# Patient Record
Sex: FEMALE | Race: WHITE | NOT HISPANIC OR LATINO
[De-identification: names, ages, dates, MRNs, and addresses within clinical notes are randomized per-mention and may not be internally consistent; named-entity substitution may affect disease eponyms.]

---

## 2022-08-21 ENCOUNTER — NON-APPOINTMENT (OUTPATIENT)
Age: 82
End: 2022-08-21

## 2023-05-10 ENCOUNTER — EMERGENCY (EMERGENCY)
Facility: HOSPITAL | Age: 83
LOS: 1 days | Discharge: HOME | End: 2023-05-10
Attending: EMERGENCY MEDICINE
Payer: MEDICARE

## 2023-05-10 VITALS
HEART RATE: 79 BPM | DIASTOLIC BLOOD PRESSURE: 70 MMHG | SYSTOLIC BLOOD PRESSURE: 193 MMHG | TEMPERATURE: 98 F | WEIGHT: 130.07 LBS | HEIGHT: 63 IN | OXYGEN SATURATION: 96 % | RESPIRATION RATE: 20 BRPM

## 2023-05-10 VITALS
SYSTOLIC BLOOD PRESSURE: 176 MMHG | RESPIRATION RATE: 16 BRPM | HEART RATE: 69 BPM | DIASTOLIC BLOOD PRESSURE: 78 MMHG | OXYGEN SATURATION: 96 %

## 2023-05-10 PROCEDURE — 30901 CONTROL OF NOSEBLEED: CPT | Mod: LT

## 2023-05-10 PROCEDURE — 99283 EMERGENCY DEPT VISIT LOW MDM: CPT

## 2023-05-10 PROCEDURE — 99283 EMERGENCY DEPT VISIT LOW MDM: CPT | Mod: FS,25

## 2023-05-10 RX ORDER — TRANEXAMIC ACID 100 MG/ML
5 INJECTION, SOLUTION INTRAVENOUS ONCE
Refills: 0 | Status: COMPLETED | OUTPATIENT
Start: 2023-05-10 | End: 2023-05-10

## 2023-05-10 RX ADMIN — TRANEXAMIC ACID 5 MILLILITER(S): 100 INJECTION, SOLUTION INTRAVENOUS at 18:41

## 2023-05-10 NOTE — ED PROVIDER NOTE - CLINICAL SUMMARY MEDICAL DECISION MAKING FREE TEXT BOX
left nare 82F presenting to the ED c/o b/l epistaxis x 1 week , 4 episodes in the past week. Left nare with active bleeding. TXA soaked gauzed placed x 15 min. Removed with no reoccurrence of bleeding x 15 min observation. Pt stable for d/c with ENT f/u.

## 2023-05-10 NOTE — ED PROVIDER NOTE - CARE PROVIDER_API CALL
Weston Robertson)  [facility]  [address] [place] [address]  Phone: [phone]  Fax: [phone]  Follow Up Time:

## 2023-05-10 NOTE — ED PROVIDER NOTE - ATTENDING APP SHARED VISIT CONTRIBUTION OF CARE
The patient discussed with PA    Epistaxis    I, Perez Gtz, performed the initial face to face bedside interview with this patient regarding history of present illness, review of symptoms and relevant past medical, social and family history.  I completed an independent physical examination.  I was the initial provider who evaluated this patient. I have signed out the follow up of any pending tests (i.e. labs, radiological studies) to the ACP.  I have communicated the patient’s plan of care and disposition with the ACP.

## 2023-05-10 NOTE — ED PROVIDER NOTE - PATIENT PORTAL LINK FT
You can access the FollowMyHealth Patient Portal offered by [facility] by registering at the following website: [email or web address]. By joining [facility]’s FollowMyHealth portal, you will also be able to view your health information using other applications (apps) compatible with our system.

## 2023-05-10 NOTE — ED PROVIDER NOTE - OBJECTIVE STATEMENT
82F presenting to the ED c/o b/l epistaxis x 1 week , 4 episodes in the past week. Pt otherwise denies dizziness, recent trauma, blood thinner use, fever/chills, c/p, sob, abd pain, n/v/c/d, dysuria, numbness/tingling/weakness and has no other complaints at this time.

## 2023-05-25 PROBLEM — Z00.00 ENCOUNTER FOR PREVENTIVE HEALTH EXAMINATION: Status: ACTIVE | Noted: 2023-05-25

## 2023-05-30 ENCOUNTER — APPOINTMENT (OUTPATIENT)
Dept: OTOLARYNGOLOGY | Facility: CLINIC | Age: 83
End: 2023-05-30
Payer: MEDICARE

## 2023-05-30 VITALS — HEART RATE: 85 BPM | SYSTOLIC BLOOD PRESSURE: 174 MMHG | DIASTOLIC BLOOD PRESSURE: 65 MMHG

## 2023-05-30 VITALS — HEIGHT: 61 IN | WEIGHT: 130 LBS | BODY MASS INDEX: 24.55 KG/M2

## 2023-05-30 DIAGNOSIS — Z80.42 FAMILY HISTORY OF MALIGNANT NEOPLASM OF PROSTATE: ICD-10-CM

## 2023-05-30 DIAGNOSIS — F17.200 NICOTINE DEPENDENCE, UNSPECIFIED, UNCOMPLICATED: ICD-10-CM

## 2023-05-30 DIAGNOSIS — R04.0 EPISTAXIS: ICD-10-CM

## 2023-05-30 DIAGNOSIS — Z86.39 PERSONAL HISTORY OF OTHER ENDOCRINE, NUTRITIONAL AND METABOLIC DISEASE: ICD-10-CM

## 2023-05-30 DIAGNOSIS — Z78.9 OTHER SPECIFIED HEALTH STATUS: ICD-10-CM

## 2023-05-30 PROCEDURE — 99204 OFFICE O/P NEW MOD 45 MIN: CPT | Mod: 25

## 2023-05-30 PROCEDURE — 31231 NASAL ENDOSCOPY DX: CPT

## 2023-05-30 NOTE — PROCEDURE
[FreeTextEntry6] : Procedure: Bilateral nasal endoscopy (CPT 49674) \par \par Indication: Anterior rhinoscopy was inadequate to evaluate pathology.\par \par Left:\par Septum midline\par Moderate inferior turbinate hypertrophy \par Middle meatus clear, without masses, polyps, or pus\par Sphenoethmoidal recess clear, without masses, polyps, or pus\par \par Right: \par Septum deviated right \par Moderate inferior turbinate hypertrophy\par Middle meatus clear, without masses, polyps, or pus \par Sphenoethmoidal recess clear, without masses, polyps, or pus\par \par

## 2023-05-30 NOTE — HISTORY OF PRESENT ILLNESS
[de-identified] : 83 year old female referred by [facility] ER for bilateral epistaxis, R>L. States has had bilateral epistaxis every week for the last month, went to [facility] ER 5/10/23 where transexamic acid was placed on both nostrils, has not had an episode since. States this was the first occurrence of bilateral epistaxis, usually lasted 20 minutes resolved with pressure, but the last epistaxis episode lasted more than an hour. Denies trauma to the nose, cauterization, anticoagulant use, history of bleeding disorders or family history of bleeding disorders. Current smoker, smokes a pack a day and rarely consumes alcohol. \par

## 2023-11-19 ENCOUNTER — NON-APPOINTMENT (OUTPATIENT)
Age: 83
End: 2023-11-19

## 2023-12-12 ENCOUNTER — APPOINTMENT (OUTPATIENT)
Dept: DERMATOLOGY | Facility: CLINIC | Age: 83
End: 2023-12-12
Payer: MEDICARE

## 2023-12-12 PROCEDURE — 99203 OFFICE O/P NEW LOW 30 MIN: CPT | Mod: 25

## 2023-12-12 PROCEDURE — 11102 TANGNTL BX SKIN SINGLE LES: CPT

## 2024-02-23 ENCOUNTER — NON-APPOINTMENT (OUTPATIENT)
Age: 84
End: 2024-02-23

## 2024-02-23 ENCOUNTER — APPOINTMENT (OUTPATIENT)
Dept: DERMATOLOGY | Facility: CLINIC | Age: 84
End: 2024-02-23
Payer: MEDICARE

## 2024-02-23 PROCEDURE — 17313 MOHS 1 STAGE T/A/L: CPT

## 2024-02-23 RX ORDER — CEPHALEXIN 500 MG/1
500 TABLET ORAL 3 TIMES DAILY
Qty: 21 | Refills: 0 | Status: ACTIVE | COMMUNITY
Start: 2024-02-23 | End: 1900-01-01

## 2024-02-24 NOTE — HISTORY OF PRESENT ILLNESS
[FreeTextEntry1] : SCC left lateral distal pretibial shin  [de-identified] : Dermatologic Surgery Consultation   02/23/2024   Referred by: Dr. Stuart   We had the pleasure of seeing your patient in consultation for Dermatologic Surgery.   Ms. LOC VAUGHN is a 83 year old F who presents for SCC on left lateral distal pretibial shin. The area was noticed by her grandson a few months ago who urged her to get evaluated. No other history of NMSC.   Pertinent positives noted below History of HIV or hepatitis: No Blood thinners: None Antibiotic Prophylaxis: None Medical implants: None   Social History: current smoker      The patient's review of systems questionnaire was reviewed. Education needs were identified. There were no barriers to learning.   Dermatologic surgery consultation for SCC left lateral distal pretibial shin    -- I explained the treatment options of topical immunomodulatory or chemotherapy, electrodessication and curettage, radiation therapy, excision and Mohs surgery.  I recommended Mohs surgery due to the tumor type, location, and size of the skin cancer.   Mohs Appropriate Use Criteria (AUC) Score: 8   I explained that following extirpation there will be a full thickness defect of the involved area. The reconstructive options will be based on the defect size and surrounding tissue laxity of the involved area. Primary closure is only possible for smaller defects. For larger defects, local tissue rearrangement or skin grafting may be necessary. Risks following layered primary closure or local tissue rearrangement include wound dehiscence, contour irregularity, bleeding, infection, and paresthesia (nerve damage including sensory deficit or motor weakness). Risks following skin grafting include wound dehiscence, skin graft nonadherence (partial or complete), contour irregularity, bleeding, infection, paresthesia, and donor site complications. I explained that the patient will need to abstain from physical activity for 1-2 weeks following the surgery, that they would heal with a scar, and also discussed the chances of infection, bleeding, potential sensory or motor nerve damage, and recurrence.  The patient indicated that s/he understood the risks and wished to proceed today -- In particular, for reconstruction we discussed secondary intent.    Thank you for this dermatologic surgery referral. We recommended that Ms. LOC VAUGHN follow up with Her referring dermatologist for routine skin exams following surgery.

## 2024-02-24 NOTE — PROCEDURE
[TextEntry] : A surgical time out was taken to confirm the patient's correct identity and the correct site. The operative site was identified by the patient and surgical team prior to surgery and the patient agreed to proceed with the surgical site which was marked prior to anesthesia infiltration.  Mohs Micrographic Surgery Report Date Collected: 02/23/2024 Date Received: Same as above Date Verified: Same as above Attending Surgeon: Kaleb Llanos MD Resident: Sol Doyle MD (Dermsurgical) Assistants: Juliet Neves MA, Anju Thakur RN Procedure#:  Diagnosis: SCC Location: left lateral distal pretibial shin   Pre-op Size: 1.1 cm  x 1.3 cm Post-Operative Final Defect Size: 1.5 cm x 1.6cm Stages (number of pieces per stage): 1 (2/1) Pathology, if tumor noted in stages: Debulk with atypical keratinocytes and keratin pearls consistent wtith SCC. Stage I with sparse perivascular lymphocytic infiltrate with no evidence of residual carcinoma.  Indications for procedure: Ill-defined tumor margins, high-priority anatomic location for preservation of normal tissue, aggressive histologic nature of the tumor Repair type: N/A (second intent healing)  Total volume of anesthesia: 9 mL   Mohs Procedure Report:  The patient was escorted to the outpatient surgical operatory. The proposed Mohs procedure and reconstruction options were discussed with the patient. The risks, benefits, and alternatives were discussed and the patient signed a written consent form. A time out was taken to confirm the patient's identity and the exact location of the skin cancer. After the patient was placed in a recumbent position, the surgical site was cleaned with alcohol and either Betadine (for eyes and ears) or chlorhexidine gluconate, draped, and infiltrated with 1% buffered lidocaine with 1:100,000 epinephrine local anesthetic. A sterile surgical marking pen was used to outline a thin margin of normal-appearing skin around the tumor. A beveled incision was then made using a scalpel. Small orienting nicks were made on the specimen and the surrounding skin. The tissue was then sharply dissected from the surrounding skin. Hemostasis was maintained with the electrosurgical unit and/or pressure. A temporary dressing was placed on the surgical defect until the frozen section slides were interpreted. The oriented specimen was placed in a Denisha dish and transported to the Mohs laboratory. For each stage of the procedure, a visual representation of the specimen was drawn on a Mohs map. This map graphically depicts the specimen's two-dimensional appearance, orientation, and tissue preparation, which consists of dividing the specimen and applying tissue dyes. Because the deep and peripheral portions of the tissue are then embedded in the same geometric plane, the map also represents an oriented scale drawing of the resulting histologic sections. The Mohs technician then prepared frozen section slides using standard techniques. The slides were stained with hematoxylin and eosin, and cover slips were applied. The frozen section slides were then examined under the microscope. If tumor was found, it was localized on the map. The orienting nicks on the original specimen corresponded to similar nicks on the surgical defect so areas of identified tumor could be mapped back to the patient and resected. Additional layers of removed skin were then processed as indicated above. This iterative process continued as applicable until no tumor was observed microscopically. At this stage, the Mohs resection was complete.  Second Intention Healing  After the surgical procedure was completed, the patient was brought back to the minor surgery operatory. Various reconstructive modalities were discussed with the patient. Second intention healing was selected as the best option. A pressure dressing composed of Vaseline ointment, Telfa, and sterile gauze and was securely taped into place. The patient was given complete verbal and written wound care instructions and was asked to follow up for a wound check as indicated. The patient ambulated from the minor surgery operatory without problems.

## 2024-02-24 NOTE — PHYSICAL EXAM
[Alert] : alert [Oriented x 3] : ~L oriented x 3 [Conjunctiva Non-injected] : conjunctiva non-injected [Well Nourished] : well nourished [No Visual Lymphadenopathy] : no visual  lymphadenopathy [No Clubbing] : no clubbing [No Edema] : no edema [No Bromhidrosis] : no bromhidrosis [No Chromhidrosis] : no chromhidrosis [Scalp] : Scalp [Hair] : Hair [Face] : Face [Nose] : Nose [Eyelids] : Eyelids [Lips] : Lips [Ears] : Ears [Neck] : Neck [L Leg] : L Leg [R Leg] : R Leg [Nodes] : Nodes [FreeTextEntry3] : 1.1 x 1.3 cm well healing biopsy site on the left lateral distal pretibial shin   No popliteal lymphadenopathy

## 2024-02-24 NOTE — ASSESSMENT
[FreeTextEntry1] : Mohs surgery for SCC left lateral distal pretibial shin  -- 1 stage(s) of Mohs surgery performed 02/23/2024  -- second intent healing  - START Keflex 500mg TID for 7 days We have discussed possible adverse effects of the medication including GI upset and hypersensitivity reaction. -- f/u for wound check 3 weeks  -- f/u for routine skin exams as previously recommended by Her referring dermatologist.     Thank you for this dermatologic surgery referral.   Kaleb Llanos MD Physician, Dermatology & Dermatologic Surgery [facility]

## 2024-02-24 NOTE — END OF VISIT
[] : Resident [FreeTextEntry3] : I personally saw and examined this patient with the resident physician and was present for the key portions of history taking, examination as well as the Mohs procedure performed. I agree with the assessment and plan as documented in the resident's note unless noted below.   Kaleb Llanos MD Physician, Dermatology and Dermatologic Surgery [facility]

## 2024-03-15 ENCOUNTER — APPOINTMENT (OUTPATIENT)
Dept: DERMATOLOGY | Facility: CLINIC | Age: 84
End: 2024-03-15
Payer: MEDICARE

## 2024-03-15 PROCEDURE — 99213 OFFICE O/P EST LOW 20 MIN: CPT

## 2024-03-16 NOTE — ASSESSMENT
[FreeTextEntry1] : Wound Check s/p Mohs surgery for SCC left lateral distal pretibial shin, Excess Granulation Tissue -- 1 stage(s) of Mohs surgery performed 02/23/2024  -- second intent healing  - gentrly debrided excess granulation tissue with curette today -RTC in 3 weeks -- f/u for routine skin exams as previously recommended by Her referring dermatologist.     Thank you for this dermatologic surgery referral.   Kaleb Llanos MD Physician, Dermatology & Dermatologic Surgery [facility]

## 2024-03-16 NOTE — PHYSICAL EXAM
[Oriented x 3] : ~L oriented x 3 [Alert] : alert [Conjunctiva Non-injected] : conjunctiva non-injected [Well Nourished] : well nourished [No Visual Lymphadenopathy] : no visual  lymphadenopathy [No Clubbing] : no clubbing [No Edema] : no edema [No Bromhidrosis] : no bromhidrosis [No Chromhidrosis] : no chromhidrosis [Hair] : Hair [Face] : Face [Scalp] : Scalp [Nose] : Nose [Eyelids] : Eyelids [Ears] : Ears [Lips] : Lips [Neck] : Neck [R Leg] : R Leg [L Leg] : L Leg [Nodes] : Nodes [FreeTextEntry3] : -left lateral pretibial area with 1.5 cm wound with granulation and exudate -gently debrided with curette

## 2024-03-16 NOTE — HISTORY OF PRESENT ILLNESS
[FreeTextEntry1] : Wound Check s/p Mohs and second intent healing for SCC left lateral distal pretibial shin  [de-identified] : 3/15/24   Ms. LOC VAUGHN is a 83 year old F who presents for wound check s/p Mohs as above. Has been keeping the wound covered with mupirocin and notes it is filling in well.   ROS: Patient denies fever, chills, night sweats, unintentional weight loss or other constitutional symptoms

## 2024-04-05 ENCOUNTER — APPOINTMENT (OUTPATIENT)
Dept: DERMATOLOGY | Facility: CLINIC | Age: 84
End: 2024-04-05
Payer: MEDICARE

## 2024-04-05 PROCEDURE — 99213 OFFICE O/P EST LOW 20 MIN: CPT

## 2024-04-06 NOTE — PHYSICAL EXAM
[Alert] : alert [Oriented x 3] : ~L oriented x 3 [Well Nourished] : well nourished [Conjunctiva Non-injected] : conjunctiva non-injected [No Visual Lymphadenopathy] : no visual  lymphadenopathy [No Clubbing] : no clubbing [No Edema] : no edema [No Bromhidrosis] : no bromhidrosis [No Chromhidrosis] : no chromhidrosis [Hair] : Hair [Scalp] : Scalp [Face] : Face [Nose] : Nose [Eyelids] : Eyelids [Ears] : Ears [Lips] : Lips [Neck] : Neck [R Leg] : R Leg [L Leg] : L Leg [Nodes] : Nodes [FreeTextEntry3] : -left lateral pretibial area with 1.5 cm wound with granulation and exudate -gently debrided with curette today

## 2024-04-06 NOTE — ASSESSMENT
[FreeTextEntry1] : Wound Check s/p Mohs surgery for SCC left lateral distal pretibial shin, Excess Granulation Tissue -- 1 stage(s) of Mohs surgery performed 02/23/2024  -- second intent healing  - gentrly debrided excess granulation tissue with curette today -RTC in 3 weeks for recheck -- f/u for routine skin exams as previously recommended by Her referring dermatologist.     Thank you for this dermatologic surgery referral.   Reason MD Romi Physician, Dermatology & Dermatologic Surgery [facility]

## 2024-04-06 NOTE — HISTORY OF PRESENT ILLNESS
[FreeTextEntry1] : Wound Check s/p Mohs and second intent healing for SCC left lateral distal pretibial shin 2/23/24 [de-identified] : 4/5/24   Ms. LOC VAUGHN is a 83 year old F who presents for wound check s/p Mohs as above. Has been keeping the wound covered with mupirocin and notes it is filling in well.   ROS: Patient denies fever, chills, night sweats, unintentional weight loss or other constitutional symptoms

## 2024-04-23 ENCOUNTER — APPOINTMENT (OUTPATIENT)
Dept: DERMATOLOGY | Facility: CLINIC | Age: 84
End: 2024-04-23
Payer: MEDICARE

## 2024-04-23 ENCOUNTER — NON-APPOINTMENT (OUTPATIENT)
Age: 84
End: 2024-04-23

## 2024-04-23 PROCEDURE — 99213 OFFICE O/P EST LOW 20 MIN: CPT

## 2024-04-24 NOTE — PHYSICAL EXAM
[Alert] : alert [Oriented x 3] : ~L oriented x 3 [Well Nourished] : well nourished [Conjunctiva Non-injected] : conjunctiva non-injected [No Visual Lymphadenopathy] : no visual  lymphadenopathy [No Clubbing] : no clubbing [No Edema] : no edema [No Bromhidrosis] : no bromhidrosis [No Chromhidrosis] : no chromhidrosis [Hair] : Hair [Scalp] : Scalp [Face] : Face [Nose] : Nose [Eyelids] : Eyelids [Ears] : Ears [Lips] : Lips [Neck] : Neck [R Leg] : R Leg [L Leg] : L Leg [Nodes] : Nodes [FreeTextEntry3] : -left lateral pretibial area with 1.0 cm wound with granulation, shallower than previous visit -gently debrided with curette today

## 2024-04-24 NOTE — ASSESSMENT
[FreeTextEntry1] : Wound Check s/p Mohs surgery for SCC left lateral distal pretibial shin, Excess Granulation Tissue -- 1 stage(s) of Mohs surgery performed 02/23/2024  -- second intent healing  - gentrly debrided excess granulation tissue with curette today -RTC in 3 weeks for recheck -- f/u for routine skin exams as previously recommended by Her referring dermatologist.     Thank you for this dermatologic surgery referral.   Reason MD Romi Physician, Dermatology & Dermatologic Surgery [facility]

## 2024-04-24 NOTE — HISTORY OF PRESENT ILLNESS
[FreeTextEntry1] : Wound Check s/p Mohs and second intent healing for SCC left lateral distal pretibial shin 2/23/24 [de-identified] : 4/23/24   Ms. LOC VAUGHN is a 83 year old F who presents for wound check s/p Mohs as above. Has been keeping the wound covered with mupirocin and notes it is filling in well and getting smaller.It was debrided at last visit with good results.  ROS: Patient denies fever, chills, night sweats, unintentional weight loss or other constitutional symptoms

## 2024-05-06 ENCOUNTER — APPOINTMENT (OUTPATIENT)
Dept: DERMATOLOGY | Facility: CLINIC | Age: 84
End: 2024-05-06
Payer: MEDICARE

## 2024-05-06 DIAGNOSIS — C44.729 SQUAMOUS CELL CARCINOMA OF SKIN OF LEFT LOWER LIMB, INCLUDING HIP: ICD-10-CM

## 2024-05-06 DIAGNOSIS — L92.9 GRANULOMATOUS DISORDER OF THE SKIN AND SUBCUTANEOUS TISSUE, UNSPECIFIED: ICD-10-CM

## 2024-05-06 PROCEDURE — 99213 OFFICE O/P EST LOW 20 MIN: CPT

## 2024-05-06 NOTE — PHYSICAL EXAM
[Alert] : alert [Oriented x 3] : ~L oriented x 3 [Well Nourished] : well nourished [Conjunctiva Non-injected] : conjunctiva non-injected [No Visual Lymphadenopathy] : no visual  lymphadenopathy [No Clubbing] : no clubbing [No Edema] : no edema [No Bromhidrosis] : no bromhidrosis [No Chromhidrosis] : no chromhidrosis [Hair] : Hair [Scalp] : Scalp [Face] : Face [Nose] : Nose [Eyelids] : Eyelids [Ears] : Ears [Lips] : Lips [Neck] : Neck [R Leg] : R Leg [L Leg] : L Leg [Nodes] : Nodes [FreeTextEntry3] : -left lateral pretibial area with 0.8 cm wound with granulation, shallower than previous visit silver nitrate to excess granulation today

## 2024-05-06 NOTE — HISTORY OF PRESENT ILLNESS
[FreeTextEntry1] : Wound Check s/p Mohs and second intent healing for SCC left lateral distal pretibial shin 2/23/24 [de-identified] : 5/6/24   Ms. LOC VAUGHN is a 83 year old F who presents for wound check s/p Mohs as above. Has been keeping the wound covered with mupirocin and notes it is filling in well and getting smaller.  ROS: Patient denies fever, chills, night sweats, unintentional weight loss or other constitutional symptoms

## 2024-05-06 NOTE — ASSESSMENT
[FreeTextEntry1] : Wound Check s/p Mohs surgery for SCC left lateral distal pretibial shin, Excess Granulation Tissue -- 1 stage(s) of Mohs surgery performed 02/23/2024  -- second intent healing  - excess granulation tissue treated with silver nitrate today -advised to keep covered with ointment and dressing, follow up PRN -- f/u for routine skin exams as previously recommended by Her referring dermatologist.     Thank you for this dermatologic surgery referral.   Kaleb Llanos MD Physician, Dermatology & Dermatologic Surgery [facility]

## 2025-08-07 ENCOUNTER — NON-APPOINTMENT (OUTPATIENT)
Age: 85
End: 2025-08-07

## 2025-09-16 ENCOUNTER — APPOINTMENT (OUTPATIENT)
Dept: DERMATOLOGY | Facility: CLINIC | Age: 85
End: 2025-09-16
Payer: MEDICARE

## 2025-09-16 PROCEDURE — 99213 OFFICE O/P EST LOW 20 MIN: CPT | Mod: 25

## 2025-09-16 PROCEDURE — 17000 DESTRUCT PREMALG LESION: CPT
